# Patient Record
Sex: FEMALE
[De-identification: names, ages, dates, MRNs, and addresses within clinical notes are randomized per-mention and may not be internally consistent; named-entity substitution may affect disease eponyms.]

---

## 2018-08-29 ENCOUNTER — TRANSCRIPTION ENCOUNTER (OUTPATIENT)
Age: 58
End: 2018-08-29

## 2019-01-13 ENCOUNTER — TRANSCRIPTION ENCOUNTER (OUTPATIENT)
Age: 59
End: 2019-01-13

## 2023-03-18 ENCOUNTER — NON-APPOINTMENT (OUTPATIENT)
Age: 63
End: 2023-03-18

## 2023-05-16 PROBLEM — Z00.00 ENCOUNTER FOR PREVENTIVE HEALTH EXAMINATION: Status: ACTIVE | Noted: 2023-05-16

## 2023-06-07 ENCOUNTER — APPOINTMENT (OUTPATIENT)
Dept: OTOLARYNGOLOGY | Facility: CLINIC | Age: 63
End: 2023-06-07
Payer: COMMERCIAL

## 2023-06-07 VITALS
OXYGEN SATURATION: 96 % | BODY MASS INDEX: 22.63 KG/M2 | DIASTOLIC BLOOD PRESSURE: 66 MMHG | HEIGHT: 62 IN | SYSTOLIC BLOOD PRESSURE: 99 MMHG | TEMPERATURE: 98 F | HEART RATE: 81 BPM | WEIGHT: 123 LBS

## 2023-06-07 DIAGNOSIS — Z82.49 FAMILY HISTORY OF ISCHEMIC HEART DISEASE AND OTHER DISEASES OF THE CIRCULATORY SYSTEM: ICD-10-CM

## 2023-06-07 DIAGNOSIS — Z83.49 FAMILY HISTORY OF OTHER ENDOCRINE, NUTRITIONAL AND METABOLIC DISEASES: ICD-10-CM

## 2023-06-07 DIAGNOSIS — Z78.9 OTHER SPECIFIED HEALTH STATUS: ICD-10-CM

## 2023-06-07 DIAGNOSIS — Z87.09 PERSONAL HISTORY OF OTHER DISEASES OF THE RESPIRATORY SYSTEM: ICD-10-CM

## 2023-06-07 DIAGNOSIS — Z80.9 FAMILY HISTORY OF MALIGNANT NEOPLASM, UNSPECIFIED: ICD-10-CM

## 2023-06-07 DIAGNOSIS — E04.2 NONTOXIC MULTINODULAR GOITER: ICD-10-CM

## 2023-06-07 DIAGNOSIS — Z82.0 FAMILY HISTORY OF EPILEPSY AND OTHER DISEASES OF THE NERVOUS SYSTEM: ICD-10-CM

## 2023-06-07 PROCEDURE — 31575 DIAGNOSTIC LARYNGOSCOPY: CPT

## 2023-06-07 PROCEDURE — 99205 OFFICE O/P NEW HI 60 MIN: CPT | Mod: 25

## 2023-06-07 PROCEDURE — 10005 FNA BX W/US GDN 1ST LES: CPT

## 2023-06-15 DIAGNOSIS — D44.0 NEOPLASM OF UNCERTAIN BEHAVIOR OF THYROID GLAND: ICD-10-CM

## 2023-06-15 PROBLEM — Z82.49 FAMILY HISTORY OF CARDIAC DISORDER: Status: ACTIVE | Noted: 2023-06-15

## 2023-06-15 PROBLEM — Z87.09 HISTORY OF SINUSITIS: Status: RESOLVED | Noted: 2023-06-15 | Resolved: 2023-06-15

## 2023-06-15 PROBLEM — Z80.9 FAMILY HISTORY OF MALIGNANT NEOPLASM: Status: ACTIVE | Noted: 2023-06-15

## 2023-06-15 PROBLEM — Z78.9 CONSUMES ALCOHOL: Status: ACTIVE | Noted: 2023-06-15

## 2023-06-15 PROBLEM — Z83.49 FAMILY HISTORY OF THYROID DISEASE: Status: ACTIVE | Noted: 2023-06-15

## 2023-06-15 PROBLEM — Z78.9 CAFFEINE USE: Status: ACTIVE | Noted: 2023-06-15

## 2023-06-15 PROBLEM — Z82.0 FAMILY HISTORY OF ALZHEIMER'S DISEASE: Status: ACTIVE | Noted: 2023-06-15

## 2023-06-15 LAB — FNA, THYROID: NORMAL

## 2023-06-15 RX ORDER — PSYLLIUM HUSK 0.4 G
CAPSULE ORAL
Refills: 0 | Status: ACTIVE | COMMUNITY

## 2023-06-15 RX ORDER — CHROMIUM 200 MCG
TABLET ORAL
Refills: 0 | Status: ACTIVE | COMMUNITY

## 2023-06-19 NOTE — REASON FOR VISIT
[FreeTextEntry2] : a surgical consultation concerning a 2.6 cm solid nodule in the left lower lobe with interval enlargement. [FreeTextEntry1] : Referrer and PCP is Opal Merrill MD

## 2023-06-19 NOTE — HISTORY OF PRESENT ILLNESS
[de-identified] : Astrid is a generally healthy 62-year-old  who has had a known NTMNG x 3 years first noted by her PCP.  She has a known nontoxic multinodular goiter on ultrasound since 2019.  The right lobe is up to 6.2 cm in sagittal height in the left lobe up to 5.8 cm in sagittal height.  There are 3 nodules identified in the right lobe including a predominately solid nodule in the lower pole measuring 1.6 cm, another predominantly solid nodule in the lower pole measuring 2.5 cm and a complex cyst measuring 1.1 cm in the mid lobe.  There is a 1 cm solid nodule in the right upper lobe the left thyroid lobe contains 3 nodules including a 2.6 cm solid nodule in the lower pole, a 1.2 cm predominantly solid nodule in the mid lobe and a 1.1 cm predominantly solid nodule in the mid lobe.  None of these nodules are suspicious for malignancy however due to the increase in size of the left lower pole nodule FNA biopsy was recommended.  No mention was made regarding any abnormal lymph nodes. Astrid denies recent shortness of breath, voice changes, dysphagia, anterior neck pain, neck pressure or mass. There is no family history of thyroid cancer. Her mother  and daughter both had hypothyroidism.  She denies any known radiation exposures in her youth.  She denies fever, body aches, cough, cyanosis, chest burning, anosmia or recent known COVID exposures.  She had a COVID infection that was mild in January 2022.  All family members at home are well.  She is fully vaccinated and boosted.

## 2023-06-19 NOTE — PROCEDURE
[Image(s) Captured] : image(s) captured and filed [Unable to Cooperate with Mirror] : patient unable to cooperate with mirror [Gag Reflex] : gag reflex preventing mirror examination [Topical Lidocaine] : topical lidocaine [Oxymetazoline HCl] : oxymetazoline HCl [Flexible Endoscope] : examined with the flexible endoscope [Serial Number: ___] : Serial Number: [unfilled] [FreeTextEntry3] : \par ...................................[facility]\par ...........ULTRASOUND-GUIDED THYROID FINE NEEDLE ASPIRATION BIOPSY REPORT\par \par NAME: MARIBELL CHRISTIE.........MR# [id number] .......: 1960 .........DATE: 2023 .........TIME: 4:35 PM\par \par HISTORY/ INDICATIONS: 62- year-old female with a left lower lobe thyroid nodule and interval enlargement.\par \par EXAM: Real-time high-resolution ultrasound imaging of the thyroid gland and/or other neck structures, was performed in the longitudinal and transverse planes with color power Doppler supplementation. Images were captured for lesion characterization, measurement and needle placement.\par \par FINDINGS: A left lower lobe nodule measuring 3.18 x 1.54 x 2.36 cm (longitudinal x AP x transverse) was identified and targeted for USG-FNA.  The nodule had the following ultrasonographic characteristics: [solid, hypoechoic, heterogeneous, smoothly marginated, no punctate echogenic foci, grade 2 vascularity on color Doppler, and wider-than-tall.\par \par PROCEDURE: A time out took place and documented for correct patient identifiers, site and side of procedure.  After obtaining informed consent with discussion of risks, benefits and alternatives, the patient was positioned semi-supine, the skin was prepped with alcohol and 0.5 cc of 1% Lidocaine with 1:100,000 epinephrine was injected for local anesthesia. A #25-gauge needle was then passed along the perpendicular plane of the transducer, positioned within the nodule and confirmed with ultrasonography.  Multiple aspirations were made within the nodule with two separate needle punctures, four passes each.  Aspirates were directly placed into both cytolyt and molecular preservative solutions for cytologic analysis, immunohistochemistry, and possible molecular genomic diagnostics.  The patient tolerated the procedure well without complications and was discharged with signed post-procedure instructions indicating the importance of following up on all results. \par \par ASSESSMENT & PLAN: Successful USG-FNA of a left lower lobe nodule was well tolerated without complications. The patient will be contacted to review the cytologic findings as soon as available for further treatment planning.  A discussion took place with the patient who accepted the responsibility to call the office to review the cytology results if no communication occurs within two weeks. \par \par Electronically signed by referring, interpreting and reporting physician:\par LES LUZ M.D., FACS on 2023, 4:55 PM.\par \par [facility]: [address], [address],\par [place] [address],  [phone] (voice), [phone] (fax) [de-identified] : The nasal septum is minimally deviated to the left. There are no masses or polyps and the nasal mucosa and secretions are normal. The choanae and posterior nasopharynx are normal without masses or drainage. The Eustachian tube orifices appear patent. The pharynx, including the posterior and lateral pharyngeal walls, the vallecula and base of tongue are normal without ulcerations, lesions or masses. The hypopharynx including the pyriform sinuses open well without pooling of secretions, mucosal lesions or masses. The supraglottic larynx including the epiglottis, petiole, arytenoids, glossoepiglottic, aryepiglottic and pharyngoepiglottic folds are normal without mucosal lesions, ulcerations or masses. The glottis reveals normal false vocal folds. The true vocal folds are glistening white, tense and of equal length, without paralysis, having symmetric mobility on adduction and abduction. There are no mucosal lesions, nodules, cysts, erythroplasia or leukoplakia. The posterior cricoid area has healthy pink mucosa in the interarytenoid area and esophageal inlet. There is minimal thickening/pachydermia of the interarytenoid mucosa suggestive of posterior laryngitis from laryngopharyngeal acid reflux disease. The trachea is clear without narrowing in the immediate subglottic region, without deviation or lesions.  [de-identified] : Thyroid neoplasm with interval growth

## 2023-06-19 NOTE — CONSULT LETTER
[Dear  ___] : Dear  [unfilled], [Consult Letter:] : I had the pleasure of evaluating your patient, [unfilled]. [Please see my note below.] : Please see my note below. [Consult Closing:] : Thank you very much for allowing me to participate in the care of this patient.  If you have any questions, please do not hesitate to contact me. [Sincerely,] : Sincerely, [FreeTextEntry3] : \par Shahzad Jaffe M.D., FACS, [facility]\par Director [facility] at [facility]\par [facility] [place]\par Certified in Thyroid/Parathyroid/Neck Ultrasound, [facility]/ [facility]\par , Department of Otolaryngology\par [employer] [facility]\par

## 2023-12-24 ENCOUNTER — NON-APPOINTMENT (OUTPATIENT)
Age: 63
End: 2023-12-24